# Patient Record
Sex: MALE | Race: WHITE | NOT HISPANIC OR LATINO | Employment: OTHER | URBAN - METROPOLITAN AREA
[De-identification: names, ages, dates, MRNs, and addresses within clinical notes are randomized per-mention and may not be internally consistent; named-entity substitution may affect disease eponyms.]

---

## 2020-03-30 ENCOUNTER — TELEPHONE (OUTPATIENT)
Dept: ORTHOPEDICS | Facility: CLINIC | Age: 40
End: 2020-03-30

## 2020-03-30 NOTE — TELEPHONE ENCOUNTER
----- Message from Mirna Sutherland sent at 3/30/2020 12:49 PM CDT -----  Contact: LISA BROWN [[id number]]  Marianna-    Can you please schedule this pt with either Dr. Cerda or Dr. Fatima?    Thanks!   ----- Message -----  From: Lexy Shirley  Sent: 3/30/2020  12:28 PM CDT  To: [facility] Hand Clinical Staff    Type: Patient Call Back    Who called: LISA BROWN [[id number]]    What is the request in detail: LISA BROWN [[id number]] is requesting a call back. He states that she was advised to follow up in the hand clinic after his ED visit on 03/22/220. He would ne a new patient. Please advise.     Radial nerve palsy, left    Can the clinic reply by [facility]? No    Best call back number: [phone]    Additional Information: N/A